# Patient Record
Sex: FEMALE | Race: WHITE | NOT HISPANIC OR LATINO
[De-identification: names, ages, dates, MRNs, and addresses within clinical notes are randomized per-mention and may not be internally consistent; named-entity substitution may affect disease eponyms.]

---

## 2022-12-19 ENCOUNTER — APPOINTMENT (OUTPATIENT)
Dept: ORTHOPEDIC SURGERY | Facility: CLINIC | Age: 87
End: 2022-12-19

## 2022-12-19 DIAGNOSIS — E78.00 PURE HYPERCHOLESTEROLEMIA, UNSPECIFIED: ICD-10-CM

## 2022-12-19 DIAGNOSIS — M50.10 CERVICAL DISC DISORDER WITH RADICULOPATHY, UNSPECIFIED CERVICAL REGION: ICD-10-CM

## 2022-12-19 DIAGNOSIS — H81.09 MENIERE'S DISEASE, UNSPECIFIED EAR: ICD-10-CM

## 2022-12-19 DIAGNOSIS — Z86.69 PERSONAL HISTORY OF OTHER DISEASES OF THE NERVOUS SYSTEM AND SENSE ORGANS: ICD-10-CM

## 2022-12-19 DIAGNOSIS — M54.16 RADICULOPATHY, LUMBAR REGION: ICD-10-CM

## 2022-12-19 DIAGNOSIS — Z78.9 OTHER SPECIFIED HEALTH STATUS: ICD-10-CM

## 2022-12-19 DIAGNOSIS — M51.26 OTHER INTERVERTEBRAL DISC DISPLACEMENT, LUMBAR REGION: ICD-10-CM

## 2022-12-19 DIAGNOSIS — I10 ESSENTIAL (PRIMARY) HYPERTENSION: ICD-10-CM

## 2022-12-19 DIAGNOSIS — H81.10 BENIGN PAROXYSMAL VERTIGO, UNSPECIFIED EAR: ICD-10-CM

## 2022-12-19 PROCEDURE — 99204 OFFICE O/P NEW MOD 45 MIN: CPT | Mod: 25

## 2022-12-19 PROCEDURE — 73030 X-RAY EXAM OF SHOULDER: CPT | Mod: FY,RT

## 2022-12-19 PROCEDURE — 20610 DRAIN/INJ JOINT/BURSA W/O US: CPT | Mod: RT

## 2022-12-19 RX ORDER — ROSUVASTATIN CALCIUM 5 MG/1
5 TABLET, FILM COATED ORAL
Refills: 0 | Status: ACTIVE | COMMUNITY

## 2022-12-19 RX ORDER — TRIAMTERENE AND HYDROCHLOROTHIAZIDE 37.5; 25 MG/1; MG/1
37.5-25 CAPSULE ORAL
Refills: 0 | Status: ACTIVE | COMMUNITY

## 2022-12-19 RX ORDER — LISINOPRIL 30 MG/1
TABLET ORAL
Refills: 0 | Status: ACTIVE | COMMUNITY

## 2022-12-19 NOTE — HISTORY OF PRESENT ILLNESS
[de-identified] : (New Visit)\par \par Patient Details\par -Age: 87\par -Occupation: retired\par -Worker’s compensation claim: no\par -No-fault claim: no\par -School injury claim: no\par \par \par Symptom Details \par -Body part: RT shoulder \par -Duration of symptoms: since 12/15\par -How symptoms began:  NKI\par -Location of pain: lateral\par -Quality of pain: sharp, stabbing \par -Pain score at rest: 2\par -Pain score with activity: 7\par -Swelling: no\par -Stiffness: yes \par -Radicular symptoms (numbness or radiating pain down extremity): no\par \par \par Treatment Details\par -Activity modification: yes\par -Medication: tylenol with no relief\par -Physical therapy: no\par -Home exercise program: no\par -Injection: no\par -MRI: no\par \par \par Paitent reports a similar episode 10 years ago that resolved with a cortisone injection.

## 2022-12-19 NOTE — DISCUSSION/SUMMARY
[de-identified] : (Assessment)\par \par History, clinical examination and imaging were most consistent with:\par -right rotator cuff tendonitis versus tear\par \par The diagnosis was explained in detail. The potential non-surgical and surgical treatments were reviewed. The relative risks and benefits of each option were considered relative to the patient’s age, activity level, medical history, symptom severity and previously attempted treatments. \par \par -Non-surgical treatment was selected. The added clinical utility of an MRI was discussed. The patient deferred further diagnostic testing at this time to pursue non-surgical treatment.\par \par \par (Plan)\par \par -Activity modification\par -Injection: performed in the office today, details in procedure note. \par -Physical therapy: script provided\par -Follow up: 6 weeks if failing to improve\par \par \par (MDM) \par \par Problem Complexity\par -Moderate: chronic illness with exacerbation\par \par Risk\par -Moderate: injection\par \par \par (Injection Procedure Note)\par \par Laterality \par -Right \par \par Location\par -Subacromial bursa\par \par Contents\par -40 mg kenalog \par -5 mL of 1% lidocaine\par \par Narrative\par -Discussed possible risks of corticosteroid injection including hyperglycemia, infection and skin discoloration. \par -Discussed that due to infection risk, an interval between injection and any future surgery is 6 weeks for arthroscopy and 3 months for arthroplasty.\par -Informed consent was obtained.\par -Injection performed using aseptic technique. Tolerated well with no complications. \par \par

## 2022-12-19 NOTE — IMAGING
[de-identified] : (Shoulder Examination)\par \par Laterality\par -Right\par \par General\par -In no acute distress: yes\par -Alert and oriented to person, place and time: yes\par -Lymphadenopathy: no\par -Peripheral edema: no\par -Sensation grossly intact to light touch: yes\par -Capillary refill less than 2 seconds: yes \par \par Inspection\par -Skin intact: yes\par -Swelling: no\par -Atrophy: no\par -Scapular winging: no\par -AC deformity: no\par -Biceps deformity: no\par \par Tenderness to Palpation\par -Bicipital groove: no \par -AC joint: no\par -Scapulothoracic: no\par -Cervical paraspinal: no\par \par Range of Motion\par -Active forward elevation: 150\par -Passive forward elevation: 175\par -External rotation at the side: 65\par -Internal rotation behind the back: T12\par -Cervical spine: normal\par \par Strength \par -Forward elevation: 5-\par -External rotation at the side: 5\par -Internal rotation at the side: 5\par -Deltoid: 5\par \par Special Tests\par -Gramajo: positive \par -O’Mitch’s: negative\par -Speed’s: negative\par -Cross body adduction: negative\par -Apprehension and relocation: negative\par -Sulcus sign: negative\par -Belly press: negative\par -Spurling’s: negative\par \par  [Right] : right shoulder [There are no fractures, subluxations or dislocations. No significant abnormalities are seen] : There are no fractures, subluxations or dislocations. No significant abnormalities are seen [Type 2 acromion] : Type 2 acromion

## 2023-01-09 ENCOUNTER — APPOINTMENT (OUTPATIENT)
Dept: ORTHOPEDIC SURGERY | Facility: CLINIC | Age: 88
End: 2023-01-09
Payer: MEDICARE

## 2023-01-09 DIAGNOSIS — Z78.9 OTHER SPECIFIED HEALTH STATUS: ICD-10-CM

## 2023-01-09 PROCEDURE — 99213 OFFICE O/P EST LOW 20 MIN: CPT

## 2023-01-09 RX ORDER — LIDOCAINE 40 MG/G
4 PATCH TOPICAL
Qty: 1 | Refills: 0 | Status: ACTIVE | COMMUNITY
Start: 2023-01-09 | End: 1900-01-01

## 2023-01-09 NOTE — IMAGING
[de-identified] : (Shoulder Examination)\par \par Laterality\par -Right\par \par General\par -In no acute distress: yes\par -Alert and oriented to person, place and time: yes\par -Lymphadenopathy: no\par -Peripheral edema: no\par -Sensation grossly intact to light touch: yes\par -Capillary refill less than 2 seconds: yes \par \par Inspection\par -Skin intact: yes\par -Swelling: no\par -Atrophy: no\par -Scapular winging: no\par -AC deformity: no\par -Biceps deformity: no\par \par Tenderness to Palpation\par -Bicipital groove: no \par -AC joint: no\par -Scapulothoracic: no\par -Cervical paraspinal: no\par \par Range of Motion\par -Active forward elevation: 165\par -Passive forward elevation: 175\par -External rotation at the side: 65\par -Internal rotation behind the back: T12\par -Cervical spine: limited and painless\par \par Strength \par -Forward elevation: 5-\par -External rotation at the side: 5\par -Internal rotation at the side: 5\par -Deltoid: 5\par \par Special Tests\par -Gramajo: positive \par -O’Mitch’s: negative\par -Speed’s: negative\par -Cross body adduction: negative\par -Apprehension and relocation: negative\par -Sulcus sign: negative\par -Belly press: negative\par -Spurling’s: negative\par \par

## 2023-01-09 NOTE — HISTORY OF PRESENT ILLNESS
[de-identified] : (Follow-Up Visit) \par \par Symptom Details\par -Body part: RT shoulder \par -Pain score at rest: 1/10\par -Pain score with activity:9/10 \par -Improvement since last visit: Pain is not as bad but still hurts. it was improving but she aggravated it doing jigsaw puzzles. she did not go to PT yet because of sciatica. she also reports mild neck pain and history of herniated disks. \par \par Interval Treatment Details\par -Activity modification: trying to use LT arm more \par -Medication: advil prn \par -Physical therapy: was given a script and has not done pt\par -Home exercise program: none yet\par -Injection: CSI last visit, worked for 2 weeks. \par -MRI: no\par \par \par Please refer to previous office visit notes for additional history.\par

## 2023-01-09 NOTE — DISCUSSION/SUMMARY
[de-identified] : (Assessment)\par \par History, clinical examination and imaging were most consistent with:\par -right rotator cuff tendonitis versus partial tear\par -cervical degenerative disk disease\par \par The diagnosis was explained in detail. The potential non-surgical and surgical treatments were reviewed. The relative risks and benefits of each option were considered relative to the patient’s age, activity level, medical history, symptom severity and previously attempted treatments. \par \par -Non-surgical treatment was selected. \par -The patient received an injection 3 weeks ago and it is too soon for another injection. She will take tylenol and use lidocaine patches.\par -She will begin PT next week, as her sciatica is resolving.\par -An MRI was again discussed and deferred as it would not change treatment recommendations. \par -Should symptoms fail to improve we would consider repeat injection versus referral for cervical spine evaluation. \par \par (Plan)\par \par -Activity modification\par -Physical therapy: as per prior script\par -Tylenol: over the counter as needed\par -Lidocaine patches: script provided\par -Follow up: 6 weeks if failing to improve\par \par \par (MDM) \par \par Problem Complexity\par -Moderate: chronic illness with exacerbation\par \par Risk\par -Low: physical therapy\par

## 2023-01-12 ENCOUNTER — FORM ENCOUNTER (OUTPATIENT)
Age: 88
End: 2023-01-12

## 2023-03-01 ENCOUNTER — APPOINTMENT (OUTPATIENT)
Dept: ORTHOPEDIC SURGERY | Facility: CLINIC | Age: 88
End: 2023-03-01

## 2023-04-04 ENCOUNTER — APPOINTMENT (OUTPATIENT)
Dept: ORTHOPEDIC SURGERY | Facility: CLINIC | Age: 88
End: 2023-04-04

## 2023-04-10 ENCOUNTER — NON-APPOINTMENT (OUTPATIENT)
Age: 88
End: 2023-04-10

## 2023-06-02 ENCOUNTER — APPOINTMENT (OUTPATIENT)
Dept: ORTHOPEDIC SURGERY | Facility: CLINIC | Age: 88
End: 2023-06-02
Payer: MEDICARE

## 2023-06-02 DIAGNOSIS — M75.111 INCOMPLETE ROTATOR CUFF TEAR OR RUPTURE OF RIGHT SHOULDER, NOT SPECIFIED AS TRAUMATIC: ICD-10-CM

## 2023-06-02 PROCEDURE — 20610 DRAIN/INJ JOINT/BURSA W/O US: CPT | Mod: RT

## 2023-06-02 PROCEDURE — 99214 OFFICE O/P EST MOD 30 MIN: CPT | Mod: 25

## 2023-06-02 NOTE — DISCUSSION/SUMMARY
[de-identified] : (Assessment and Plan)\par \par History, clinical examination and imaging were most consistent with:\par -right rotator cuff partial tear\par -cervical degenerative disk disease with radiculopathy\par \par The diagnosis was explained in detail. The potential non-surgical and surgical treatments were reviewed. The relative risks and benefits of each option were considered relative to the patient’s age, activity level, medical history, symptom severity and previously attempted treatments. \par \par The patient was advised to consult with their primary medical provider prior to initiation of any new medications to reduce the risk of adverse effects specific to their long-term home medications and medical history. The risk of gastrointestinal irritation and kidney injury specific to long-term NSAID use was discussed.   \par \par -Patient will continue with formal PT.\par -Cortisone injection will be performed for symptom relief.\par -Over the counter acetaminophen as needed for pain. \par -The added clinical utility of an MRI was discussed. The patient deferred further diagnostic testing at this time.\par -We discussed that surgery is not recommended for this problem due her concomitant medical issues. \par -Follow up in 3 months. Consider cervical referral versus repeat injection at that time. \par \par \par (MDM) \par \par Problem Complexity\par -Moderate: chronic illness with exacerbation\par \par Risk\par -Moderate: injection\par \par (Procedure Note)\par \par Injection location was the:\par -right subacromial bursa\par \par Injection contents were: \par 40 mg of kenalog and 5 mL of 1% lidocaine\par \par Procedure Details: \par -Informed consent was obtained. Discussed possible risks of corticosteroid injection including hyperglycemia, infection and skin discoloration. \par -Discussed that due to infection risk, an interval between injection and any future surgery is 6 weeks for arthroscopy and 3 months for arthroplasty.\par -Injection performed using aseptic technique. Hemostasis was confirmed.\par -Procedure was tolerated well with no complications. \par \par

## 2023-06-02 NOTE — HISTORY OF PRESENT ILLNESS
[de-identified] : (History of Present Illness)\par \par Body part causing symptoms is the RT shoulder \par Pain score at rest is 8/10\par Pain score during activity is 8/10\par Treatments since last visit include PT\par Compared to last visit, symptoms are similar\par She reports that she was in the hospital recently for hip and back problems\par She went to rehab for GI issues and back problems

## 2023-06-02 NOTE — IMAGING
[de-identified] : (Shoulder Examination)\par \par Laterality\par -Right\par \par General\par -In no acute distress: yes\par -Alert and oriented to person, place and time: yes\par -Lymphadenopathy: no\par -Peripheral edema: no\par -Sensation grossly intact to light touch: yes\par -Capillary refill less than 2 seconds: yes \par \par Inspection\par -Skin intact: yes\par -Swelling: no\par -Atrophy: no\par -Scapular winging: no\par -AC deformity: no\par -Biceps deformity: no\par \par Tenderness to Palpation\par -Bicipital groove: no \par -AC joint: no\par -Scapulothoracic: no\par -Cervical paraspinal: no\par \par Range of Motion\par -Active forward elevation: 165\par -Passive forward elevation: 175\par -External rotation at the side: 65\par -Internal rotation behind the back: T12\par -Cervical spine: limited and painless\par \par Strength \par -Forward elevation: 5-\par -External rotation at the side: 5\par -Internal rotation at the side: 5\par -Deltoid: 5\par \par Special Tests\par -Gramajo: positive \par -O’Mitch’s: negative\par -Speed’s: negative\par -Cross body adduction: negative\par -Apprehension and relocation: negative\par -Sulcus sign: negative\par -Belly press: negative\par -Spurling’s: negative\par \par